# Patient Record
Sex: MALE | URBAN - METROPOLITAN AREA
[De-identification: names, ages, dates, MRNs, and addresses within clinical notes are randomized per-mention and may not be internally consistent; named-entity substitution may affect disease eponyms.]

---

## 2022-11-10 ENCOUNTER — OFFICE (OUTPATIENT)
Dept: URBAN - METROPOLITAN AREA CLINIC 93 | Facility: CLINIC | Age: 52
Setting detail: OPHTHALMOLOGY
End: 2022-11-10
Payer: COMMERCIAL

## 2022-11-10 DIAGNOSIS — H53.433: ICD-10-CM

## 2022-11-10 DIAGNOSIS — H35.033: ICD-10-CM

## 2022-11-10 DIAGNOSIS — H50.22: ICD-10-CM

## 2022-11-10 DIAGNOSIS — H40.1131: ICD-10-CM

## 2022-11-10 PROCEDURE — 92133 CPTRZD OPH DX IMG PST SGM ON: CPT | Performed by: OPHTHALMOLOGY

## 2022-11-10 PROCEDURE — 92083 EXTENDED VISUAL FIELD XM: CPT | Performed by: OPHTHALMOLOGY

## 2022-11-10 PROCEDURE — 92012 INTRM OPH EXAM EST PATIENT: CPT | Performed by: OPHTHALMOLOGY

## 2022-11-10 ASSESSMENT — REFRACTION_CURRENTRX
OD_AXIS: 083
OD_OVR_VA: 20/
OS_CYLINDER: -0.75
OS_AXIS: 090
OS_ADD: +1.75
OS_OVR_VA: 20/
OD_SPHERE: -0.75
OS_SPHERE: -0.75
OD_CYLINDER: -0.50
OD_ADD: +1.75

## 2022-11-10 ASSESSMENT — REFRACTION_MANIFEST
OD_SPHERE: PLANO
OS_SPHERE: PLANO
OD_ADD: +1.75
OS_ADD: +1.75
OS_AXIS: 095
OD_AXIS: 090
OD_CYLINDER: -1.50
OS_CYLINDER: -1.25
OD_VA1: 20/20
OS_VA1: 20/20

## 2022-11-10 ASSESSMENT — REFRACTION_AUTOREFRACTION
OD_CYLINDER: -1.75
OS_AXIS: 097
OD_SPHERE: -0.25
OD_AXIS: 083
OS_SPHERE: -0.75
OS_CYLINDER: -1.25

## 2022-11-10 ASSESSMENT — CONFRONTATIONAL VISUAL FIELD TEST (CVF)
OD_FINDINGS: FULL
OS_FINDINGS: FULL

## 2022-11-10 ASSESSMENT — VISUAL ACUITY
OD_BCVA: 20/25
OS_BCVA: 20/20

## 2022-11-10 ASSESSMENT — SPHEQUIV_DERIVED
OS_SPHEQUIV: -1.375
OD_SPHEQUIV: -1.125

## 2022-11-10 ASSESSMENT — KERATOMETRY
OD_K2POWER_DIOPTERS: 40.50
OD_AXISANGLE_DEGREES: 077
OS_K1POWER_DIOPTERS: UNABLE
OD_K1POWER_DIOPTERS: 41.50

## 2022-11-10 ASSESSMENT — TONOMETRY
OS_IOP_MMHG: 18
OD_IOP_MMHG: 15

## 2022-11-10 ASSESSMENT — PACHYMETRY
OS_CT_UM: 590
OD_CT_UM: 580
OD_CT_CORRECTION: -3
OS_CT_CORRECTION: -4

## 2022-11-10 ASSESSMENT — AXIALLENGTH_DERIVED: OD_AL: 25.0312

## 2023-03-09 ENCOUNTER — OFFICE (OUTPATIENT)
Facility: LOCATION | Age: 53
Setting detail: OPHTHALMOLOGY
End: 2023-03-09
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
## 2023-03-09 PROCEDURE — NO SHOW FE NO SHOW FEE: Performed by: OPHTHALMOLOGY

## 2024-02-15 ENCOUNTER — OFFICE (OUTPATIENT)
Facility: LOCATION | Age: 54
Setting detail: OPHTHALMOLOGY
End: 2024-02-15
Payer: COMMERCIAL

## 2024-02-15 DIAGNOSIS — H35.033: ICD-10-CM

## 2024-02-15 DIAGNOSIS — H25.13: ICD-10-CM

## 2024-02-15 DIAGNOSIS — H50.22: ICD-10-CM

## 2024-02-15 DIAGNOSIS — H16.223: ICD-10-CM

## 2024-02-15 DIAGNOSIS — H40.1131: ICD-10-CM

## 2024-02-15 PROCEDURE — 99213 OFFICE O/P EST LOW 20 MIN: CPT | Performed by: OPHTHALMOLOGY

## 2024-02-15 PROCEDURE — 92020 GONIOSCOPY: CPT | Performed by: OPHTHALMOLOGY

## 2024-02-15 PROCEDURE — 92133 CPTRZD OPH DX IMG PST SGM ON: CPT | Performed by: OPHTHALMOLOGY

## 2024-02-15 PROCEDURE — 92083 EXTENDED VISUAL FIELD XM: CPT | Performed by: OPHTHALMOLOGY

## 2024-02-15 ASSESSMENT — REFRACTION_MANIFEST
OD_CYLINDER: -1.50
OD_ADD: +1.75
OS_VA1: 20/20
OS_SPHERE: PLANO
OD_SPHERE: PLANO
OS_CYLINDER: -1.25
OS_AXIS: 095
OS_ADD: +1.75
OD_AXIS: 090
OD_VA1: 20/20

## 2024-02-15 ASSESSMENT — REFRACTION_CURRENTRX
OD_OVR_VA: 20/
OS_OVR_VA: 20/
OS_CYLINDER: -0.75
OD_CYLINDER: -0.50
OD_SPHERE: -0.75
OS_ADD: +1.75
OD_ADD: +1.75
OD_AXIS: 083
OS_AXIS: 090
OS_SPHERE: -0.75

## 2024-02-15 ASSESSMENT — SPHEQUIV_DERIVED
OD_SPHEQUIV: -1.375
OS_SPHEQUIV: -0.75

## 2024-02-15 ASSESSMENT — REFRACTION_AUTOREFRACTION
OD_CYLINDER: -2.25
OS_SPHERE: +0.25
OS_AXIS: 088
OD_SPHERE: -0.25
OD_AXIS: 089
OS_CYLINDER: -2.00

## 2024-02-15 ASSESSMENT — SUPERFICIAL PUNCTATE KERATITIS (SPK)
OD_SPK: 2+
OS_SPK: 2+

## 2024-03-07 ENCOUNTER — OFFICE (OUTPATIENT)
Facility: LOCATION | Age: 54
Setting detail: OPHTHALMOLOGY
End: 2024-03-07
Payer: COMMERCIAL

## 2024-03-07 ENCOUNTER — RX ONLY (RX ONLY)
Age: 54
End: 2024-03-07

## 2024-03-07 DIAGNOSIS — H40.1131: ICD-10-CM

## 2024-03-07 PROCEDURE — 99213 OFFICE O/P EST LOW 20 MIN: CPT | Performed by: OPHTHALMOLOGY

## 2024-03-07 ASSESSMENT — REFRACTION_MANIFEST
OS_ADD: +1.75
OD_ADD: +1.75
OD_CYLINDER: -1.50
OD_AXIS: 090
OD_SPHERE: PLANO
OS_CYLINDER: -1.25
OD_VA1: 20/20
OS_VA1: 20/20
OS_AXIS: 095
OS_SPHERE: PLANO

## 2024-03-07 ASSESSMENT — REFRACTION_CURRENTRX
OD_CYLINDER: -1.00
OS_OVR_VA: 20/
OD_AXIS: 092
OS_CYLINDER: -0.75
OD_ADD2: 1BD
OD_SPHERE: -0.50
OS_ADD: +1.75
OD_ADD: +1.75
OD_OVR_VA: 20/
OS_SPHERE: --0.50
OS_AXIS: 093

## 2024-06-14 ENCOUNTER — OFFICE (OUTPATIENT)
Facility: LOCATION | Age: 54
Setting detail: OPHTHALMOLOGY
End: 2024-06-14
Payer: COMMERCIAL

## 2024-06-14 DIAGNOSIS — H50.22: ICD-10-CM

## 2024-06-14 DIAGNOSIS — H25.13: ICD-10-CM

## 2024-06-14 DIAGNOSIS — H52.4: ICD-10-CM

## 2024-06-14 DIAGNOSIS — H16.223: ICD-10-CM

## 2024-06-14 DIAGNOSIS — H40.1131: ICD-10-CM

## 2024-06-14 DIAGNOSIS — H35.033: ICD-10-CM

## 2024-06-14 PROBLEM — H52.7 REFRACTIVE ERROR: Status: ACTIVE | Noted: 2024-06-14

## 2024-06-14 PROCEDURE — 92015 DETERMINE REFRACTIVE STATE: CPT | Performed by: OPHTHALMOLOGY

## 2024-06-14 PROCEDURE — 92014 COMPRE OPH EXAM EST PT 1/>: CPT | Performed by: OPHTHALMOLOGY

## 2024-06-14 ASSESSMENT — CONFRONTATIONAL VISUAL FIELD TEST (CVF)
OS_FINDINGS: FULL
OD_FINDINGS: FULL

## 2024-07-26 ENCOUNTER — APPOINTMENT (OUTPATIENT)
Dept: GASTROENTEROLOGY | Facility: CLINIC | Age: 54
End: 2024-07-26
Payer: COMMERCIAL

## 2024-07-26 ENCOUNTER — RX RENEWAL (OUTPATIENT)
Age: 54
End: 2024-07-26

## 2024-07-26 VITALS
BODY MASS INDEX: 32.58 KG/M2 | WEIGHT: 220 LBS | SYSTOLIC BLOOD PRESSURE: 92 MMHG | RESPIRATION RATE: 15 BRPM | OXYGEN SATURATION: 94 % | HEIGHT: 69 IN | DIASTOLIC BLOOD PRESSURE: 67 MMHG | HEART RATE: 101 BPM

## 2024-07-26 DIAGNOSIS — K20.90 ESOPHAGITIS, UNSPECIFIED WITHOUT BLEEDING: ICD-10-CM

## 2024-07-26 DIAGNOSIS — Z12.11 ENCOUNTER FOR SCREENING FOR MALIGNANT NEOPLASM OF COLON: ICD-10-CM

## 2024-07-26 PROBLEM — Z00.00 ENCOUNTER FOR PREVENTIVE HEALTH EXAMINATION: Status: ACTIVE | Noted: 2024-07-26

## 2024-07-26 PROCEDURE — 99204 OFFICE O/P NEW MOD 45 MIN: CPT

## 2024-07-26 RX ORDER — OMEPRAZOLE 40 MG/1
40 CAPSULE, DELAYED RELEASE ORAL
Qty: 90 | Refills: 0 | Status: ACTIVE | COMMUNITY
Start: 2024-07-26 | End: 1900-01-01

## 2024-07-26 NOTE — HISTORY OF PRESENT ILLNESS
[FreeTextEntry1] : Chief complaint: alteration in bowel habits   HPI: Patient presents for new patient Gastroenterology evaluation because of alteration in bowel habits and encounter for colon cancer screening.   Patient with change in bowel habits with loose fragmented bowel movements.   He has not noted any rectal bleeding; no hematemesis, melena or hematochezia.   Will schedule patient for screening colonoscopy at a [facility].   the risks, benefits, alternatives of procedure were reviewed with patient who gives informed consent

## 2024-10-08 ENCOUNTER — APPOINTMENT (OUTPATIENT)
Dept: GASTROENTEROLOGY | Facility: HOSPITAL | Age: 54
End: 2024-10-08

## 2024-10-08 ENCOUNTER — OUTPATIENT (OUTPATIENT)
Dept: OUTPATIENT SERVICES | Facility: HOSPITAL | Age: 54
LOS: 1 days | End: 2024-10-08
Payer: COMMERCIAL

## 2024-10-08 DIAGNOSIS — R10.30 LOWER ABDOMINAL PAIN, UNSPECIFIED: ICD-10-CM

## 2024-10-08 DIAGNOSIS — Z12.11 ENCOUNTER FOR SCREENING FOR MALIGNANT NEOPLASM OF COLON: ICD-10-CM

## 2024-10-08 PROCEDURE — 45380 COLONOSCOPY AND BIOPSY: CPT | Mod: XS,PT

## 2024-10-08 PROCEDURE — 88305 TISSUE EXAM BY PATHOLOGIST: CPT

## 2024-10-08 PROCEDURE — 88305 TISSUE EXAM BY PATHOLOGIST: CPT | Mod: 26

## 2024-10-08 PROCEDURE — 45385 COLONOSCOPY W/LESION REMOVAL: CPT | Mod: PT

## 2024-10-08 PROCEDURE — C1889: CPT

## 2024-10-08 PROCEDURE — 45385 COLONOSCOPY W/LESION REMOVAL: CPT

## 2024-10-08 DEVICE — CLIP HEMO INSTINCT PLUS ENDOSCOPIC: Type: IMPLANTABLE DEVICE | Status: FUNCTIONAL

## 2024-10-08 DEVICE — CLIP RESOLUTION 360 235CM: Type: IMPLANTABLE DEVICE | Status: FUNCTIONAL

## 2024-10-28 ENCOUNTER — APPOINTMENT (OUTPATIENT)
Dept: GASTROENTEROLOGY | Facility: CLINIC | Age: 54
End: 2024-10-28
Payer: COMMERCIAL

## 2024-10-28 VITALS
HEART RATE: 87 BPM | HEIGHT: 69 IN | OXYGEN SATURATION: 96 % | SYSTOLIC BLOOD PRESSURE: 104 MMHG | WEIGHT: 198 LBS | DIASTOLIC BLOOD PRESSURE: 71 MMHG | BODY MASS INDEX: 29.33 KG/M2

## 2024-10-28 DIAGNOSIS — Z12.11 ENCOUNTER FOR SCREENING FOR MALIGNANT NEOPLASM OF COLON: ICD-10-CM

## 2024-10-28 DIAGNOSIS — K20.90 ESOPHAGITIS, UNSPECIFIED WITHOUT BLEEDING: ICD-10-CM

## 2024-10-28 DIAGNOSIS — K92.1 MELENA: ICD-10-CM

## 2024-10-28 PROCEDURE — 99214 OFFICE O/P EST MOD 30 MIN: CPT

## 2024-11-17 ENCOUNTER — EMERGENCY (EMERGENCY)
Facility: HOSPITAL | Age: 54
LOS: 1 days | Discharge: HOME | End: 2024-11-17
Attending: EMERGENCY MEDICINE
Payer: COMMERCIAL

## 2024-11-17 VITALS
WEIGHT: 175.05 LBS | RESPIRATION RATE: 18 BRPM | OXYGEN SATURATION: 96 % | DIASTOLIC BLOOD PRESSURE: 88 MMHG | TEMPERATURE: 98 F | HEART RATE: 95 BPM | SYSTOLIC BLOOD PRESSURE: 129 MMHG

## 2024-11-17 VITALS
TEMPERATURE: 98 F | OXYGEN SATURATION: 98 % | SYSTOLIC BLOOD PRESSURE: 110 MMHG | RESPIRATION RATE: 19 BRPM | HEART RATE: 91 BPM | DIASTOLIC BLOOD PRESSURE: 72 MMHG

## 2024-11-17 PROCEDURE — 72125 CT NECK SPINE W/O DYE: CPT | Mod: 26,MC

## 2024-11-17 PROCEDURE — 99284 EMERGENCY DEPT VISIT MOD MDM: CPT

## 2024-11-17 PROCEDURE — 70450 CT HEAD/BRAIN W/O DYE: CPT | Mod: 26,MC

## 2024-11-17 RX ORDER — ACETAMINOPHEN 500 MG
975 TABLET ORAL ONCE
Refills: 0 | Status: DISCONTINUED | OUTPATIENT
Start: 2024-11-17 | End: 2024-11-25

## 2024-11-17 NOTE — ED ADULT TRIAGE NOTE - CHIEF COMPLAINT QUOTE
BIBEMS from [facility] s/p trip and fall on corner of bed and hit head on cabinet. +hematoma on forehead and nose on Xarelto. Dr. Spencer made aware. Last drink of ETOH was 7 days ago

## 2024-11-18 PROCEDURE — 72125 CT NECK SPINE W/O DYE: CPT | Mod: MC

## 2024-11-18 PROCEDURE — 70450 CT HEAD/BRAIN W/O DYE: CPT | Mod: MC

## 2024-11-18 PROCEDURE — 99284 EMERGENCY DEPT VISIT MOD MDM: CPT | Mod: 25

## 2024-11-18 NOTE — ED ADULT NURSE NOTE - CHIEF COMPLAINT QUOTE
BIBEMS from [facility] s/p trip and fall on corner of bed and hit head on cabinet. +hematoma on forehead and nose on Xarelto. Dr. Spencer made aware. Last drink of ETOH was 7 days ago

## 2024-11-18 NOTE — ED PROVIDER NOTE - OBJECTIVE STATEMENT
Patient presents to ED status post mechanical fall at detox center complaining of generalized achy headache on Xarelto no blurry vision no chest pain no shortness of breath no abdominal pain no signs of active alcohol withdrawal no SI no HI sent from [facility] ambulating no acute distress

## 2024-11-18 NOTE — ED PROVIDER NOTE - CLINICAL SUMMARY MEDICAL DECISION MAKING FREE TEXT BOX
ambulating no acute distress spoke with nurse Bar at [facility] to accept patient back return precautions given to patient    return to ed for intractable HA, persistent vomiting, or new onset motor/sensory deficits

## 2024-11-18 NOTE — ED PROVIDER NOTE - PATIENT PORTAL LINK FT
You can access the FollowMyHealth Patient Portal offered by [facility] by registering at the following website: [email or web address]. By joining [email or web address]’s FollowMyHealth portal, you will also be able to view your health information using other applications (apps) compatible with our system.

## (undated) DEVICE — NDL INJ SCLERO INTERJECT 23G

## (undated) DEVICE — CATH ELCTR GLIDE PRB 7FR

## (undated) DEVICE — TUBING SUCTION CONN 6FT STERILE

## (undated) DEVICE — STERIS DEFENDO 3-PIECE KIT (AIR/WATER, SUCTION & BIOPSY VALVES)

## (undated) DEVICE — FORCEP RADIAL JAW 4 W NDL 2.4MM 2.8MM 240CM ORANGE DISP

## (undated) DEVICE — SNARE LRG

## (undated) DEVICE — SNARE POLYP SENS 27MM 240CM

## (undated) DEVICE — TUBE RECTAL 24FR

## (undated) DEVICE — SOL IRR POUR H2O 500ML

## (undated) DEVICE — ELCTR GROUNDING PAD ADULT COVIDIEN

## (undated) DEVICE — SOL IRR NS 0.9% 250ML

## (undated) DEVICE — FORCEP RADIAL JAW 4 JUMBO 2.8MM 3.2MM 240CM ORANGE DISP

## (undated) DEVICE — TRAP QUICK CATCH  SINGL CHAMBER

## (undated) DEVICE — CLAMP BX HOT RAD JAW 3

## (undated) DEVICE — MARKER ENDO SPOT EX

## (undated) DEVICE — CATH IV SAFE BC 22G X 1" (BLUE)

## (undated) DEVICE — FORMALIN CUPS 10% BUFFERED

## (undated) DEVICE — CANISTER SUCTION 1200CC 10/SL

## (undated) DEVICE — CATH ELECHMSTAT  INJ 7FR 210CM

## (undated) DEVICE — TUBING IV SET GRAVITY 3Y 100" MACRO

## (undated) DEVICE — POLY TRAP ETRAP

## (undated) DEVICE — TUBING CANNULA SALTER LABS NASAL ADULT 7FT

## (undated) DEVICE — SYR LUER SLIP TIP 50CC

## (undated) DEVICE — SUT HEWSON RETRIEVER

## (undated) DEVICE — TUBE O2 SUPL CRUSH RESIS CONN SOUTHSIDE ONLY

## (undated) DEVICE — TUBING IV SET SECONDARY 34"

## (undated) DEVICE — TRAP SPECIMEN SPUTUM 40CC

## (undated) DEVICE — ENDOCUFF VISION SZ 2 LG GRN

## (undated) DEVICE — CATH IV SAFE BC 20G X 1.16" (PINK)

## (undated) DEVICE — SYR LUER SLIP TIP 30CC

## (undated) DEVICE — RETRIEVER ROTH NET PLATINUM-UNIVERSAL

## (undated) DEVICE — SUCTION YANKAUER TAPERED BULBOUS NO VENT

## (undated) DEVICE — BRUSH COLONOSCOPY CYTOLOGY

## (undated) DEVICE — VALVE BIOPSY

## (undated) DEVICE — SYR IV POSIFLUSH NS 3ML 30/TY

## (undated) DEVICE — MASK LRG MED AND HIGH O2 CONC M TO M 10FT

## (undated) DEVICE — SENSOR O2 FINGER XL ADULT 24/BX 6BX/CA

## (undated) DEVICE — ENDOCUFF VISION SZ 3 SM PRPL

## (undated) DEVICE — Device

## (undated) DEVICE — PACK IV START WITH CHG

## (undated) DEVICE — MASK O2 NON REBREATH 3IN1 ADULT

## (undated) DEVICE — SET IV PUMP BLOOD 1VALVE 180FILTER NON-DEHP